# Patient Record
Sex: FEMALE | Race: WHITE | NOT HISPANIC OR LATINO | Employment: FULL TIME | URBAN - METROPOLITAN AREA
[De-identification: names, ages, dates, MRNs, and addresses within clinical notes are randomized per-mention and may not be internally consistent; named-entity substitution may affect disease eponyms.]

---

## 2023-02-22 ENCOUNTER — HOSPITAL ENCOUNTER (INPATIENT)
Facility: HOSPITAL | Age: 68
LOS: 3 days | Discharge: HOME | DRG: 661 | End: 2023-02-25
Attending: UROLOGY | Admitting: UROLOGY
Payer: COMMERCIAL

## 2023-02-22 DIAGNOSIS — N13.6 PYONEPHROSIS: ICD-10-CM

## 2023-02-22 DIAGNOSIS — N13.30 HYDRONEPHROSIS: Primary | ICD-10-CM

## 2023-02-22 DIAGNOSIS — N20.1 RIGHT URETERAL STONE: ICD-10-CM

## 2023-02-22 DIAGNOSIS — N20.0 KIDNEY STONE: ICD-10-CM

## 2023-02-22 PROCEDURE — 11000001 HC ACUTE MED/SURG PRIVATE ROOM

## 2023-02-22 RX ORDER — ESCITALOPRAM OXALATE 20 MG/1
20 TABLET ORAL DAILY
COMMUNITY

## 2023-02-22 RX ORDER — LEVOTHYROXINE SODIUM 112 UG/1
112 TABLET ORAL
COMMUNITY

## 2023-02-22 RX ORDER — ASPIRIN 325 MG
325 TABLET ORAL DAILY
COMMUNITY

## 2023-02-22 RX ORDER — ESCITALOPRAM OXALATE 10 MG/1
20 TABLET ORAL NIGHTLY
Status: DISCONTINUED | OUTPATIENT
Start: 2023-02-23 | End: 2023-02-25 | Stop reason: HOSPADM

## 2023-02-22 RX ORDER — HYDROMORPHONE HYDROCHLORIDE 2 MG/ML
1 INJECTION, SOLUTION INTRAMUSCULAR; INTRAVENOUS; SUBCUTANEOUS
Status: DISCONTINUED | OUTPATIENT
Start: 2023-02-23 | End: 2023-02-25 | Stop reason: HOSPADM

## 2023-02-22 RX ORDER — DEXTROSE MONOHYDRATE AND SODIUM CHLORIDE 5; .45 G/100ML; G/100ML
INJECTION, SOLUTION INTRAVENOUS CONTINUOUS
Status: DISCONTINUED | OUTPATIENT
Start: 2023-02-23 | End: 2023-02-25 | Stop reason: HOSPADM

## 2023-02-22 RX ORDER — ASPIRIN 325 MG
325 TABLET, DELAYED RELEASE (ENTERIC COATED) ORAL DAILY
Status: DISCONTINUED | OUTPATIENT
Start: 2023-02-23 | End: 2023-02-25 | Stop reason: HOSPADM

## 2023-02-22 RX ORDER — DEXTROSE MONOHYDRATE AND SODIUM CHLORIDE 5; .45 G/100ML; G/100ML
INJECTION, SOLUTION INTRAVENOUS CONTINUOUS
Status: DISCONTINUED | OUTPATIENT
Start: 2023-02-23 | End: 2023-02-22

## 2023-02-22 RX ORDER — LEVOTHYROXINE SODIUM 112 UG/1
112 TABLET ORAL
Status: DISCONTINUED | OUTPATIENT
Start: 2023-02-24 | End: 2023-02-25 | Stop reason: HOSPADM

## 2023-02-22 RX ORDER — HYDROCODONE BITARTRATE AND ACETAMINOPHEN 7.5; 325 MG/1; MG/1
1 TABLET ORAL EVERY 6 HOURS PRN
Status: DISCONTINUED | OUTPATIENT
Start: 2023-02-23 | End: 2023-02-25 | Stop reason: HOSPADM

## 2023-02-23 ENCOUNTER — ANESTHESIA EVENT (OUTPATIENT)
Dept: SURGERY | Facility: HOSPITAL | Age: 68
DRG: 661 | End: 2023-02-23
Payer: COMMERCIAL

## 2023-02-23 ENCOUNTER — ANESTHESIA (OUTPATIENT)
Dept: SURGERY | Facility: HOSPITAL | Age: 68
DRG: 661 | End: 2023-02-23
Payer: COMMERCIAL

## 2023-02-23 PROBLEM — N20.1 RIGHT URETERAL STONE: Status: ACTIVE | Noted: 2023-02-23

## 2023-02-23 PROBLEM — N13.6 PYONEPHROSIS: Status: ACTIVE | Noted: 2023-02-23

## 2023-02-23 PROBLEM — N13.30 HYDRONEPHROSIS: Status: ACTIVE | Noted: 2023-02-23

## 2023-02-23 PROCEDURE — 11000001 HC ACUTE MED/SURG PRIVATE ROOM

## 2023-02-23 PROCEDURE — 63600175 PHARM REV CODE 636 W HCPCS: Performed by: ANESTHESIOLOGY

## 2023-02-23 PROCEDURE — C2617 STENT, NON-COR, TEM W/O DEL: HCPCS | Performed by: UROLOGY

## 2023-02-23 PROCEDURE — 63600175 PHARM REV CODE 636 W HCPCS: Performed by: NURSE ANESTHETIST, CERTIFIED REGISTERED

## 2023-02-23 PROCEDURE — 25000003 PHARM REV CODE 250: Performed by: UROLOGY

## 2023-02-23 PROCEDURE — 36000706: Performed by: UROLOGY

## 2023-02-23 PROCEDURE — 25000003 PHARM REV CODE 250: Performed by: NURSE ANESTHETIST, CERTIFIED REGISTERED

## 2023-02-23 PROCEDURE — 36000707: Performed by: UROLOGY

## 2023-02-23 PROCEDURE — 37000008 HC ANESTHESIA 1ST 15 MINUTES: Performed by: UROLOGY

## 2023-02-23 PROCEDURE — 87088 URINE BACTERIA CULTURE: CPT | Performed by: UROLOGY

## 2023-02-23 PROCEDURE — C1758 CATHETER, URETERAL: HCPCS | Performed by: UROLOGY

## 2023-02-23 PROCEDURE — S5010 5% DEXTROSE AND 0.45% SALINE: HCPCS | Performed by: UROLOGY

## 2023-02-23 PROCEDURE — 71000033 HC RECOVERY, INTIAL HOUR: Performed by: UROLOGY

## 2023-02-23 PROCEDURE — 37000009 HC ANESTHESIA EA ADD 15 MINS: Performed by: UROLOGY

## 2023-02-23 PROCEDURE — C1769 GUIDE WIRE: HCPCS | Performed by: UROLOGY

## 2023-02-23 DEVICE — STENT SET URETERAL 6FR 22CM: Type: IMPLANTABLE DEVICE | Site: URETER | Status: FUNCTIONAL

## 2023-02-23 RX ORDER — PROPOFOL 10 MG/ML
INJECTION, EMULSION INTRAVENOUS
Status: DISCONTINUED | OUTPATIENT
Start: 2023-02-23 | End: 2023-02-23

## 2023-02-23 RX ORDER — HYDROMORPHONE HYDROCHLORIDE 2 MG/ML
0.4 INJECTION, SOLUTION INTRAMUSCULAR; INTRAVENOUS; SUBCUTANEOUS EVERY 5 MIN PRN
Status: DISCONTINUED | OUTPATIENT
Start: 2023-02-23 | End: 2023-02-25 | Stop reason: HOSPADM

## 2023-02-23 RX ORDER — MIDAZOLAM HYDROCHLORIDE 1 MG/ML
INJECTION INTRAMUSCULAR; INTRAVENOUS
Status: DISCONTINUED | OUTPATIENT
Start: 2023-02-23 | End: 2023-02-23

## 2023-02-23 RX ORDER — ONDANSETRON 2 MG/ML
INJECTION INTRAMUSCULAR; INTRAVENOUS
Status: DISCONTINUED | OUTPATIENT
Start: 2023-02-23 | End: 2023-02-23

## 2023-02-23 RX ORDER — OXYBUTYNIN CHLORIDE 5 MG/1
5 TABLET ORAL 3 TIMES DAILY
Status: DISCONTINUED | OUTPATIENT
Start: 2023-02-23 | End: 2023-02-25 | Stop reason: HOSPADM

## 2023-02-23 RX ORDER — SODIUM CHLORIDE, SODIUM LACTATE, POTASSIUM CHLORIDE, CALCIUM CHLORIDE 600; 310; 30; 20 MG/100ML; MG/100ML; MG/100ML; MG/100ML
INJECTION, SOLUTION INTRAVENOUS CONTINUOUS
Status: CANCELLED | OUTPATIENT
Start: 2023-02-23

## 2023-02-23 RX ORDER — HYDROCODONE BITARTRATE AND ACETAMINOPHEN 5; 325 MG/1; MG/1
1 TABLET ORAL
Status: DISCONTINUED | OUTPATIENT
Start: 2023-02-23 | End: 2023-02-25 | Stop reason: HOSPADM

## 2023-02-23 RX ORDER — ONDANSETRON 2 MG/ML
4 INJECTION INTRAMUSCULAR; INTRAVENOUS ONCE
Status: COMPLETED | OUTPATIENT
Start: 2023-02-23 | End: 2023-02-23

## 2023-02-23 RX ORDER — ONDANSETRON 2 MG/ML
INJECTION INTRAMUSCULAR; INTRAVENOUS
Status: DISPENSED
Start: 2023-02-23 | End: 2023-02-24

## 2023-02-23 RX ORDER — FENTANYL CITRATE 50 UG/ML
INJECTION, SOLUTION INTRAMUSCULAR; INTRAVENOUS
Status: DISCONTINUED | OUTPATIENT
Start: 2023-02-23 | End: 2023-02-23

## 2023-02-23 RX ORDER — ONDANSETRON 2 MG/ML
4 INJECTION INTRAMUSCULAR; INTRAVENOUS DAILY PRN
Status: DISCONTINUED | OUTPATIENT
Start: 2023-02-23 | End: 2023-02-25 | Stop reason: HOSPADM

## 2023-02-23 RX ORDER — PROCHLORPERAZINE EDISYLATE 5 MG/ML
5 INJECTION INTRAMUSCULAR; INTRAVENOUS EVERY 30 MIN PRN
Status: DISCONTINUED | OUTPATIENT
Start: 2023-02-23 | End: 2023-02-25 | Stop reason: HOSPADM

## 2023-02-23 RX ORDER — DEXAMETHASONE SODIUM PHOSPHATE 4 MG/ML
INJECTION, SOLUTION INTRA-ARTICULAR; INTRALESIONAL; INTRAMUSCULAR; INTRAVENOUS; SOFT TISSUE
Status: DISCONTINUED | OUTPATIENT
Start: 2023-02-23 | End: 2023-02-23

## 2023-02-23 RX ORDER — OXYCODONE HYDROCHLORIDE 5 MG/1
5 TABLET ORAL EVERY 4 HOURS PRN
Status: CANCELLED | OUTPATIENT
Start: 2023-02-23

## 2023-02-23 RX ORDER — LIDOCAINE HYDROCHLORIDE 20 MG/ML
INJECTION, SOLUTION EPIDURAL; INFILTRATION; INTRACAUDAL; PERINEURAL
Status: DISCONTINUED | OUTPATIENT
Start: 2023-02-23 | End: 2023-02-23

## 2023-02-23 RX ORDER — SODIUM CHLORIDE, SODIUM GLUCONATE, SODIUM ACETATE, POTASSIUM CHLORIDE AND MAGNESIUM CHLORIDE 30; 37; 368; 526; 502 MG/100ML; MG/100ML; MG/100ML; MG/100ML; MG/100ML
INJECTION, SOLUTION INTRAVENOUS CONTINUOUS
Status: CANCELLED | OUTPATIENT
Start: 2023-02-23 | End: 2023-03-25

## 2023-02-23 RX ORDER — CEFTRIAXONE 250 MG/1
INJECTION, POWDER, FOR SOLUTION INTRAMUSCULAR; INTRAVENOUS
Status: DISCONTINUED | OUTPATIENT
Start: 2023-02-23 | End: 2023-02-23

## 2023-02-23 RX ORDER — MIDAZOLAM HYDROCHLORIDE 1 MG/ML
2 INJECTION INTRAMUSCULAR; INTRAVENOUS ONCE AS NEEDED
Status: CANCELLED | OUTPATIENT
Start: 2023-02-23 | End: 2034-07-22

## 2023-02-23 RX ORDER — MEPERIDINE HYDROCHLORIDE 25 MG/ML
6.25 INJECTION INTRAMUSCULAR; INTRAVENOUS; SUBCUTANEOUS ONCE AS NEEDED
Status: ACTIVE | OUTPATIENT
Start: 2023-02-23 | End: 2023-02-24

## 2023-02-23 RX ADMIN — OXYBUTYNIN CHLORIDE 5 MG: 5 TABLET ORAL at 04:02

## 2023-02-23 RX ADMIN — SODIUM CHLORIDE, SODIUM GLUCONATE, SODIUM ACETATE, POTASSIUM CHLORIDE AND MAGNESIUM CHLORIDE: 526; 502; 368; 37; 30 INJECTION, SOLUTION INTRAVENOUS at 02:02

## 2023-02-23 RX ADMIN — DEXTROSE AND SODIUM CHLORIDE: 5; 450 INJECTION, SOLUTION INTRAVENOUS at 12:02

## 2023-02-23 RX ADMIN — ESCITSLOPRAM 20 MG: 10 TABLET ORAL at 09:02

## 2023-02-23 RX ADMIN — ONDANSETRON 4 MG: 2 INJECTION INTRAMUSCULAR; INTRAVENOUS at 03:02

## 2023-02-23 RX ADMIN — ONDANSETRON 4 MG: 2 INJECTION INTRAMUSCULAR; INTRAVENOUS at 01:02

## 2023-02-23 RX ADMIN — CEFTRIAXONE SODIUM 1 G: 250 INJECTION, POWDER, FOR SOLUTION INTRAMUSCULAR; INTRAVENOUS at 02:02

## 2023-02-23 RX ADMIN — DEXAMETHASONE SODIUM PHOSPHATE 8 MG: 4 INJECTION, SOLUTION INTRA-ARTICULAR; INTRALESIONAL; INTRAMUSCULAR; INTRAVENOUS; SOFT TISSUE at 02:02

## 2023-02-23 RX ADMIN — FENTANYL CITRATE 50 MCG: 50 INJECTION, SOLUTION INTRAMUSCULAR; INTRAVENOUS at 02:02

## 2023-02-23 RX ADMIN — FENTANYL CITRATE 50 MCG: 50 INJECTION, SOLUTION INTRAMUSCULAR; INTRAVENOUS at 03:02

## 2023-02-23 RX ADMIN — MIDAZOLAM HYDROCHLORIDE 2 MG: 1 INJECTION, SOLUTION INTRAMUSCULAR; INTRAVENOUS at 02:02

## 2023-02-23 RX ADMIN — PROPOFOL 200 MG: 10 INJECTION, EMULSION INTRAVENOUS at 02:02

## 2023-02-23 RX ADMIN — OXYBUTYNIN CHLORIDE 5 MG: 5 TABLET ORAL at 09:02

## 2023-02-23 RX ADMIN — DEXTROSE AND SODIUM CHLORIDE: 5; 450 INJECTION, SOLUTION INTRAVENOUS at 09:02

## 2023-02-23 RX ADMIN — LIDOCAINE HYDROCHLORIDE 80 MG: 20 INJECTION, SOLUTION EPIDURAL; INFILTRATION; INTRACAUDAL; PERINEURAL at 02:02

## 2023-02-23 NOTE — H&P
Yi January 1955  [id number]  2/23/2023    HPI: Ms. Ludwig is a 68 yo female transferred from [facility] in [place] for Urology services.  She has past medical history of hypertension, thyroid disease and diabetes presented to the emergency department under direction of her PCP with complaints of generalized weakness, decreased appetite and abnormal renal function.  Diagnosed with a UTI a few weeks ago, treated with antibiotics, once her course was complete she was still feeling bad so went to there ED, had repeat labs and no imaging despite persistent flank pain. Followed up with her PCP, had repeat labs which showed worsening renal function.  Reports associated nausea but denies vomiting, abdominal pain, fever, chills, gross hematuria or dysuria.  Labs on arrival there include WBC 13.5, H&H 10.9 and 34.6, BUN and creatinine 33 and 3.84, UA shows turbid yellow urine, negative nitrites, 10-20 red blood cells, too numerous to count white blood cells and no bacteria seen of note, after 2 days of admission and IV fluids her BUN and creatinine were down to 24 and 2.68.  Abdominal/pelvic CT showed right-sided UPJ stone measuring a maximum of approximately 12 mm causing right-sided hydronephrosis. Her flank pain is now intermittent. Denies known history of kidney stones or any other Urologic problems.       Past Medical History:   Diagnosis Date    Cancer      Past Surgical History:   Procedure Laterality Date    HYSTERECTOMY      THYROIDECTOMY       Family history reviewed; noncontributory    Social History     Substance Use Topics    Alcohol use: Never     Current Facility-Administered Medications   Medication Dose Route Frequency Provider Last Rate Last Admin    aspirin EC tablet 325 mg  325 mg Oral Daily Karri Reed MD        dextrose 5 % and 0.45 % NaCl infusion   Intravenous Continuous Karri Reed  mL/hr at 02/23/23 0907 New Bag at 02/23/23 0907    EScitalopram oxalate tablet 20  "mg  20 mg Oral QHS Karri Reed MD        HYDROcodone-acetaminophen 7.5-325 mg per tablet 1 tablet  1 tablet Oral Q6H PRN Karri Reed MD        HYDROmorphone (PF) injection 1 mg  1 mg Intravenous Q3H PRN Karri Reed MD        [START ON 2/24/2023] levothyroxine tablet 112 mcg  112 mcg Oral Before breakfast Karri Reed MD         Review of patient's allergies indicates:  No Known Allergies    ROS: 12 point review of systems negative other than the HPI    PHYSICAL EXAM:  Vitals:    02/22/23 2251 02/22/23 2259 02/23/23 0400 02/23/23 0744   BP: (!) 157/91 (!) 157/91 (!) 148/87 (!) 146/84   BP Location: Right arm      Pulse: 78 78 88 62   Resp: 18 18 18 18   Temp: 98.9 °F (37.2 °C) 98.9 °F (37.2 °C) 98 °F (36.7 °C) 97.9 °F (36.6 °C)   TempSrc: Oral  Oral Oral   SpO2:  98% 97% (!) 93%   Weight: 74.7 kg (164 lb 10.9 oz) 74.4 kg (164 lb 0.4 oz)     Height: 5' 1" (1.549 m) 5' 1" (1.549 m)       No intake or output data in the 24 hours ending 02/23/23 0909    GEN: WN/WD NAD  HEENT: NCAT, PERRLA, EOMI, OP clear, nares patent  CV: RRR  RESP: Even and unlabored  ABD: obese, soft, NTND  : no CVA tenderness  EXT: no C/C/E  NEURO: no focal deficits, MAEW, AAOx4      LABS:  No results found for this or any previous visit (from the past 24 hour(s)).      IMAGING: No disc sent for review      ASSESSMENT:  Acute renal failure secondary to an obstructing 12 mm UPJ stone also causing hydronephrosis. Also has been treated for recurrent UTI's the last few weeks prior to stone diagnosis      PLAN:  Discussed diagnosis, treatment options, risks and benefits. Patient agreeable to move forward with cysto and right ureteral stent placement today. Ultimately she will need ESWL which we can not offer her at this campus. She agrees to follow up with Dr. Blunt in [place] for ESWL, she would also like to be transferred back to [place] post-operatively for her physicians to resume care which is appropriate. Informed " consent obtained.    JANKI Rowe

## 2023-02-23 NOTE — OP NOTE
Discussion with collaborating staff took place since patient's last visit February 23, 2023    [facility]    Preop diagnosis is right ureteral stone with hydronephrosis  Postop diagnosis:  Right ureteral stone                                 Hydronephrosis with pyonephrosis  Surgeon Bean Madrigal   Anesthesia general    Complications none  Blood loss none    Findings:  1.2 cm proximal right ureteral stone with cloudy milky urine from the renal pelvis sent for culture    67-year-old female with recurrent urinary tract infections she is being treated in [place] for these.  She presented to the emergency room was found to have 1.2 cm right proximal stone.  She was transferred here for stent placement.  Risks and benefits I discussed usually leave the stent in for 2 weeks and then she can have definitive treatment.  She can choose to do this and they trials or she can come back here whatever her preference is fine        She is taken operative suite underwent general anesthesia placed in dorsal lithotomy prepped draped usual sterile fashion a 22 [place]ayan cystoscope with a 30 degree lens was inserted urethra was unremarkable,   looking in the bladder there was mild cystitis cystica on the floor ureteral orifices were normal size shape position with clear efflux bilaterally.  A 5 [place] open-ended catheter was inserted into the right ureteral orifice retrograde pyelogram revealed a normal distal ureter.  The large proximal ureteral stone was visible on  imaging contrast at this point pushed the stone back into the renal pelvis.  The catheter was advanced up into the collecting system.  Aspiration was carried out with return of milky cloudy urine from the right renal pelvis.  A Glidewire was inserted through the 5 U[place]ayan catheter.  A 6 x 22 JJ stent was then inserted on the right side using the Seldinger technique.  A good coil was obtained within the renal pelvis.  Look in the bladder stent was in good position.  There is continued milky drain around the stent.   She was awakened taken recovery room stable condition   Discussion with collaborating staff took place since patient's last visit Discussion with collaborating staff took place since patient's last visit Discussion with collaborating staff took place since patient's last visit Discussion with collaborating staff took place since patient's last visit Discussion with collaborating staff took place since patient's last visit Discussion with collaborating staff took place since patient's last visit Discussion with collaborating staff took place since patient's last visit

## 2023-02-23 NOTE — NURSING
Nurses Note -- 4 Eyes      2/23/2023   1:18 AM      Skin assessed during: Admit      [x] No Pressure Injuries Present    []Prevention Measures Documented      [] Yes- Altered Skin Integrity Present or Discovered   [] LDA Added if Not in Epic (Describe Wound)   [] New Altered Skin Integrity was Present on Admit and Documented in LDA   [] Wound Image Taken    Wound Care Consulted? No    Attending Nurse:  Shabana Laguerre RN     Second RN/Staff Member: Renetta HINOJOSA RN

## 2023-02-23 NOTE — ANESTHESIA PREPROCEDURE EVALUATION
"                                                                                                             02/23/2023  Yi Ludwig is a 67 y.o., female with ----------------------------  Cancer    And ----------------------------  Hysterectomy  Thyroidectomy    .for cysto with stent placement. Currently pt feels well, no nausea, no vomiting  Is NPO but is hungry       "Yi Ludwig 1955  [id number]  2/23/2023     HPI: Ms. Ludwig is a 66 yo female transferred from [facility] in [place] for Urology services.  She has past medical history of hypertension, thyroid disease and diabetes presented to the emergency department under direction of her PCP with complaints of generalized weakness, decreased appetite and abnormal renal function.  Diagnosed with a UTI a few weeks ago, treated with antibiotics, once her course was complete she was still feeling bad so went to there ED, had repeat labs and no imaging despite persistent flank pain. Followed up with her PCP, had repeat labs which showed worsening renal function.  Reports associated nausea but denies vomiting, abdominal pain, fever, chills, gross hematuria or dysuria.  Labs on arrival there include WBC 13.5, H&H 10.9 and 34.6, BUN and creatinine 33 and 3.84, UA shows turbid yellow urine, negative nitrites, 10-20 red blood cells, too numerous to count white blood cells and no bacteria seen of note, after 2 days of admission and IV fluids her BUN and creatinine were down to 24 and 2.68.  Abdominal/pelvic CT showed right-sided UPJ stone measuring a maximum of approximately 12 mm causing right-sided hydronephrosis. Her flank pain is now intermittent. Denies known history of kidney stones or any other Urologic problems. "    Pre-op Assessment    I have reviewed the NPO Status.      Review of Systems      Physical Exam  General: Well nourished, Cooperative, Alert and Oriented    Airway:  Mallampati: II   Mouth Opening: Normal  TM Distance: " Normal  Tongue: Normal  Neck ROM: Normal ROM    Dental:  Intact    Chest/Lungs:  Clear to auscultation, Normal Respiratory Rate    Heart:  Rate: Normal  Rhythm: Regular Rhythm        Anesthesia Plan  Type of Anesthesia, risks & benefits discussed:    Anesthesia Type: Gen Supraglottic Airway  Intra-op Monitoring Plan: Standard ASA Monitors  Post Op Pain Control Plan: IV/PO Opioids PRN and multimodal analgesia  Induction:  IV  Airway Plan: Direct  Informed Consent: Informed consent signed with the Patient and all parties understand the risks and agree with anesthesia plan.  All questions answered. Patient consented to blood products? No  ASA Score: 3  Day of Surgery Review of History & Physical: H&P Update referred to the surgeon/provider.  Anesthesia Plan Notes: Premedication with Midazolam  Technique: LMA with OG tube will have ETT readily available  PONV Prophylaxis   PACU Postop       Ready For Surgery From Anesthesia Perspective.     .

## 2023-02-23 NOTE — PLAN OF CARE
Problem: Adult Inpatient Plan of Care  Goal: Plan of Care Review  Outcome: Ongoing, Progressing  Flowsheets (Taken 2/23/2023 0119)  Plan of Care Reviewed With:   patient   daughter     Problem: Adult Inpatient Plan of Care  Goal: Absence of Hospital-Acquired Illness or Injury  Outcome: Ongoing, Progressing  Intervention: Identify and Manage Fall Risk  Flowsheets (Taken 2/23/2023 0119)  Safety Promotion/Fall Prevention:   assistive device/personal item within reach   lighting adjusted  Intervention: Prevent Skin Injury  Flowsheets (Taken 2/23/2023 0119)  Body Position:   position changed independently   weight shifting  Skin Protection: tubing/devices free from skin contact  Intervention: Prevent and Manage VTE (Venous Thromboembolism) Risk  Flowsheets (Taken 2/23/2023 0119)  Activity Management: Ambulated in room - L4  VTE Prevention/Management: bleeding risk assessed  Range of Motion:   active ROM (range of motion) encouraged   ROM (range of motion) performed  Intervention: Prevent Infection  Flowsheets (Taken 2/23/2023 0119)  Infection Prevention:   cohorting utilized   environmental surveillance performed   equipment surfaces disinfected   hand hygiene promoted   personal protective equipment utilized   rest/sleep promoted   single patient room provided   visitors restricted/screened     Problem: Adult Inpatient Plan of Care  Goal: Absence of Hospital-Acquired Illness or Injury  Intervention: Prevent and Manage VTE (Venous Thromboembolism) Risk  Flowsheets (Taken 2/23/2023 0119)  Activity Management: Ambulated in room - L4  VTE Prevention/Management: bleeding risk assessed  Range of Motion:   active ROM (range of motion) encouraged   ROM (range of motion) performed     Problem: Adult Inpatient Plan of Care  Goal: Optimal Comfort and Wellbeing  Outcome: Ongoing, Progressing  Intervention: Monitor Pain and Promote Comfort  Flowsheets (Taken 2/23/2023 0119)  Pain Management Interventions:   care clustered    medication offered  Intervention: Provide Person-Centered Care  Flowsheets (Taken 2/23/2023 0119)  Trust Relationship/Rapport:   care explained   choices provided   emotional support provided   empathic listening provided   questions answered   questions encouraged   reassurance provided   thoughts/feelings acknowledged

## 2023-02-23 NOTE — TRANSFER OF CARE
"Anesthesia Transfer of Care Note    Patient: Stephanie January    Procedure(s) Performed: Procedure(s) (LRB):  CYSTOSCOPY, WITH URETERAL STENT INSERTION (Right)    Patient location: PACU    Anesthesia Type: general    Transport from OR: Transported from OR on room air with adequate spontaneous ventilation    Post pain: adequate analgesia    Post assessment: no apparent anesthetic complications    Post vital signs: stable    Level of consciousness: sedated    Nausea/Vomiting: no nausea/vomiting    Complications: none    Transfer of care protocol was followed      Last vitals:   Visit Vitals  /61   Pulse 68   Temp 36.9 °C (98.5 °F) (Oral)   Resp (!) 9   Ht 5' 1" (1.549 m)   Wt 74.4 kg (164 lb 0.4 oz)   SpO2 (!) 92%   Breastfeeding No   BMI 30.99 kg/m²     "

## 2023-02-23 NOTE — PROGRESS NOTES
02/23/23 1118   Discharge Assessment   Assessment Type Discharge Planning Assessment   Confirmed/corrected address, phone number and insurance Yes   Confirmed Demographics Correct on Facesheet   Source of Information patient;family   When was your last doctors appointment? 02/20/23  (PCP: Dr. iSna Andrews)   Communicated KAPIL with patient/caregiver Date not available/Unable to determine   Reason For Admission hydronephrosis   People in Home spouse   Facility Arrived From: [facility]   Do you expect to return to your current living situation? Yes   Do you have help at home or someone to help you manage your care at home? Yes   Who are your caregiver(s) and their phone number(s)?  and daughter   Prior to hospitilization cognitive status: Alert/Oriented   Current cognitive status: Alert/Oriented   Home Accessibility wheelchair accessible   Home Layout Able to live on 1st floor   Equipment Currently Used at Home none   Readmission within 30 days? No   Patient currently being followed by outpatient case management? No   Do you currently have service(s) that help you manage your care at home? No   Do you take prescription medications? Yes  (fills rx at [facility])   Do you have prescription coverage? Yes   Coverage BCBS   Do you have any problems affording any of your prescribed medications? No   Is the patient taking medications as prescribed? yes   Who is going to help you get home at discharge? family   How do you get to doctors appointments? car, drives self   Are you on dialysis? No   Do you take coumadin? No   Discharge Plan A Other  (Transfer back to [facility])   DME Needed Upon Discharge  none   Discharge Plan discussed with: Patient;Adult children   Discharge Barriers Identified None     Will return to [facility] in [place] after stent is placed to finish renal monitoring via [facility].

## 2023-02-23 NOTE — PROGRESS NOTES
Inpatient Nutrition Assessment    Admit Date: 2/22/2023   Total duration of encounter: 1 day     Nutrition Recommendation/Prescription     Diabetic diet as tolerated.    Boost GC once daily. 250 kcals and 14 g protein per serving.    Boost Max once daily. 160 kcals and 30 g protein per serving.     Communication of Recommendations: reviewed with patient/caregiver and reviewed with nurse    Nutrition Assessment     Malnutrition Assessment/Nutrition-Focused Physical Exam    Malnutrition in the context of acute illness or injury  Degree of Malnutrition: non-severe (moderate) malnutrition  Energy Intake: </= 50% of estimated energy requirement for >/= 5 days  Interpretation of Weight Loss: >5% in 1 month  Body Fat:does not meet criteria  Area of Body Fat Loss: does not meet criteria  Muscle Mass Loss: mild depletion  Area of Muscle Mass Loss: temple region - temporalis muscle and clavicle bone region - pectoralis major, deltoid, trapezius muscles  Fluid Accumulation: does not meet criteria  Edema: does not meet criteria   Reduced  Strength: unable to obtain  A minimum of two characteristics is recommended for diagnosis of either severe or non-severe malnutrition.    Chart Review    Reason Seen: malnutrition screening tool (MST)    Malnutrition Screening Tool Results   Have you recently lost weight without trying?: Yes: 14-23 lbs  Have you been eating poorly because of a decreased appetite?: Yes   MST Score: 3     Diagnosis:  KAITLYNN  UPJ Stone  Hydronephrosis  DM    Relevant Medical History: hypertension, thyroid disease and diabetes    Nutrition-Related Medications: aspirin   Calorie Containing IV Medications: no significant kcals from medications at this time    Nutrition-Related Labs:  2/23/23: No recent labs     Diet/PN Order: Diet Adult Regular  Oral Supplement Order: none  Tube Feeding Order: none  Appetite/Oral Intake: NPO/NPO  Factors Affecting Nutritional Intake: NPO  Food/Judaism/Cultural Preferences: none  "reported  Food Allergies: none reported       Wound(s):   n/a    Comments    2/23/23: Pt reports poor appetite and nausea x 3 weeks, lost 12-15 lbs unintentionally over that time, last po intake was pudding last night. Pt had been losing weight intentionally for a while after making some changes to her diet to improve her health before this started. Agrees to vanilla Boost.     Anthropometrics    Height: 5' 1" (154.9 cm) Height Method: Measured  Last Weight: 74.4 kg (164 lb 0.4 oz) (02/22/23 2259) Weight Method: Standard Scale  BMI (Calculated): 31  BMI Classification: obese grade I (BMI 30-34.9)     Ideal Body Weight (IBW), Female: 105 lb     % Ideal Body Weight, Female (lb): 156.21 %                             Usual Weight Provided By: patient and EMR weight history  177-180 lbs 1 month ago per pt   Wt Readings from Last 5 Encounters:   02/22/23 74.4 kg (164 lb 0.4 oz)     Weight Change(s) Since Admission:  Admit Weight: 74.7 kg (164 lb 10.9 oz) (02/22/23 2251)      Estimated Needs     Energy: 2486-6946 kcals (20-25 kcals/kg CBW)    Protein: 90 g (1.2 g/kg CBW)    Fluid: 1860 ml (25 ml/kg CBW)            Temp: 97.5 °F (36.4 °C)       Enteral Nutrition    Patient not receiving enteral nutrition at this time.    Parenteral Nutrition    Patient not receiving parenteral nutrition support at this time.    Evaluation of Received Nutrient Intake    Calories: not meeting estimated needs  Protein: not meeting estimated needs    Patient Education    Not applicable.    Nutrition Diagnosis     PES: Malnutrition related to energy intake < energy demand as evidenced by unintentional weight loss, poor po intake, muscle loss in the setting of .significant nausea d/t hydronephrosis (new)    Interventions/Goals     Intervention(s): general/healthful diet, commercial beverage, and collaboration with other providers  Goal: Meet greater than 75% of nutritional needs by follow-up. (new)    Monitoring & Evaluation     Dietitian will " monitor food and beverage intake, weight, electrolyte/renal panel, glucose/endocrine profile, and gastrointestinal profile.  Nutrition Risk/Follow-Up: high (follow-up in 1-4 days)   Please consult if re-assessment needed sooner.

## 2023-02-23 NOTE — ANESTHESIA PROCEDURE NOTES
Intubation    Date/Time: 2/23/2023 2:46 PM  Performed by: Eden Barber CRNA  Authorized by: Maggie Mccoy MD     Intubation:     Induction:  Intravenous    Intubated:  Postinduction    Mask Ventilation:  Easy mask    Attempts:  1    Attempted By:  CRNA    Difficult Airway Encountered?: No      Complications:  None    Airway Device:  Supraglottic airway/LMA    Airway Device Size:  4.0    Style/Cuff Inflation:  Cuffed    Secured at:  The lips

## 2023-02-23 NOTE — PLAN OF CARE
Problem: Adult Inpatient Plan of Care  Goal: Plan of Care Review  Outcome: Ongoing, Progressing  Goal: Patient-Specific Goal (Individualized)  Outcome: Ongoing, Progressing  Goal: Absence of Hospital-Acquired Illness or Injury  Outcome: Ongoing, Progressing  Intervention: Identify and Manage Fall Risk  Flowsheets (Taken 2/23/2023 1211)  Safety Promotion/Fall Prevention:   assistive device/personal item within reach   nonskid shoes/socks when out of bed   side rails raised x 2  Intervention: Prevent Skin Injury  Flowsheets (Taken 2/23/2023 1211)  Body Position: position changed independently  Intervention: Prevent and Manage VTE (Venous Thromboembolism) Risk  Flowsheets (Taken 2/23/2023 1211)  Activity Management: Ambulated in room - L4  Goal: Optimal Comfort and Wellbeing  Outcome: Ongoing, Progressing  Intervention: Provide Person-Centered Care  Flowsheets (Taken 2/23/2023 1211)  Trust Relationship/Rapport:   care explained   questions encouraged   questions answered  Goal: Readiness for Transition of Care  Outcome: Ongoing, Progressing     Problem: Pain Acute  Goal: Acceptable Pain Control and Functional Ability  Outcome: Ongoing, Progressing

## 2023-02-24 LAB
ANION GAP SERPL CALC-SCNC: 6 MEQ/L
BASOPHILS # BLD AUTO: 0.03 X10(3)/MCL (ref 0–0.2)
BASOPHILS NFR BLD AUTO: 0.5 %
BUN SERPL-MCNC: 17.6 MG/DL (ref 9.8–20.1)
CALCIUM SERPL-MCNC: 8.3 MG/DL (ref 8.4–10.2)
CHLORIDE SERPL-SCNC: 110 MMOL/L (ref 98–107)
CO2 SERPL-SCNC: 23 MMOL/L (ref 23–31)
CREAT SERPL-MCNC: 2.22 MG/DL (ref 0.55–1.02)
CREAT/UREA NIT SERPL: 8
EOSINOPHIL # BLD AUTO: 0 X10(3)/MCL (ref 0–0.9)
EOSINOPHIL NFR BLD AUTO: 0 %
ERYTHROCYTE [DISTWIDTH] IN BLOOD BY AUTOMATED COUNT: 14.3 % (ref 11.5–17)
GFR SERPLBLD CREATININE-BSD FMLA CKD-EPI: 24 MLS/MIN/1.73/M2
GLUCOSE SERPL-MCNC: 149 MG/DL (ref 82–115)
HCT VFR BLD AUTO: 27.8 % (ref 37–47)
HGB BLD-MCNC: 8.4 G/DL (ref 12–16)
IMM GRANULOCYTES # BLD AUTO: 0.02 X10(3)/MCL (ref 0–0.04)
IMM GRANULOCYTES NFR BLD AUTO: 0.3 %
LYMPHOCYTES # BLD AUTO: 1.14 X10(3)/MCL (ref 0.6–4.6)
LYMPHOCYTES NFR BLD AUTO: 19.9 %
MCH RBC QN AUTO: 27.6 PG
MCHC RBC AUTO-ENTMCNC: 30.2 G/DL (ref 33–36)
MCV RBC AUTO: 91.4 FL (ref 80–94)
MONOCYTES # BLD AUTO: 0.18 X10(3)/MCL (ref 0.1–1.3)
MONOCYTES NFR BLD AUTO: 3.1 %
NEUTROPHILS # BLD AUTO: 4.35 X10(3)/MCL (ref 2.1–9.2)
NEUTROPHILS NFR BLD AUTO: 76.2 %
NRBC BLD AUTO-RTO: 0 %
PLATELET # BLD AUTO: 291 X10(3)/MCL (ref 130–400)
PMV BLD AUTO: 9.1 FL (ref 7.4–10.4)
POTASSIUM SERPL-SCNC: 4.6 MMOL/L (ref 3.5–5.1)
RBC # BLD AUTO: 3.04 X10(6)/MCL (ref 4.2–5.4)
SODIUM SERPL-SCNC: 139 MMOL/L (ref 136–145)
WBC # SPEC AUTO: 5.7 X10(3)/MCL (ref 4.5–11.5)

## 2023-02-24 PROCEDURE — 63600175 PHARM REV CODE 636 W HCPCS: Performed by: UROLOGY

## 2023-02-24 PROCEDURE — 25000003 PHARM REV CODE 250: Performed by: UROLOGY

## 2023-02-24 PROCEDURE — S5010 5% DEXTROSE AND 0.45% SALINE: HCPCS | Performed by: UROLOGY

## 2023-02-24 PROCEDURE — 76937 US GUIDE VASCULAR ACCESS: CPT

## 2023-02-24 PROCEDURE — 36410 VNPNXR 3YR/> PHY/QHP DX/THER: CPT

## 2023-02-24 PROCEDURE — 11000001 HC ACUTE MED/SURG PRIVATE ROOM

## 2023-02-24 PROCEDURE — 80048 BASIC METABOLIC PNL TOTAL CA: CPT | Performed by: UROLOGY

## 2023-02-24 PROCEDURE — C1751 CATH, INF, PER/CENT/MIDLINE: HCPCS

## 2023-02-24 PROCEDURE — 85025 COMPLETE CBC W/AUTO DIFF WBC: CPT | Performed by: UROLOGY

## 2023-02-24 RX ADMIN — OXYBUTYNIN CHLORIDE 5 MG: 5 TABLET ORAL at 08:02

## 2023-02-24 RX ADMIN — LEVOTHYROXINE SODIUM 112 MCG: 112 TABLET ORAL at 06:02

## 2023-02-24 RX ADMIN — CEFTRIAXONE 1 G: 1 INJECTION, POWDER, FOR SOLUTION INTRAMUSCULAR; INTRAVENOUS at 01:02

## 2023-02-24 RX ADMIN — DEXTROSE AND SODIUM CHLORIDE: 5; 450 INJECTION, SOLUTION INTRAVENOUS at 09:02

## 2023-02-24 RX ADMIN — OXYBUTYNIN CHLORIDE 5 MG: 5 TABLET ORAL at 03:02

## 2023-02-24 RX ADMIN — ASPIRIN 325 MG: 325 TABLET, COATED ORAL at 08:02

## 2023-02-24 RX ADMIN — ESCITSLOPRAM 20 MG: 10 TABLET ORAL at 08:02

## 2023-02-24 NOTE — PLAN OF CARE
Problem: Adult Inpatient Plan of Care  Goal: Plan of Care Review  Outcome: Ongoing, Progressing  Flowsheets (Taken 2/24/2023 0104)  Plan of Care Reviewed With:   patient   daughter  Goal: Patient-Specific Goal (Individualized)  Outcome: Ongoing, Progressing  Goal: Absence of Hospital-Acquired Illness or Injury  Outcome: Ongoing, Progressing  Intervention: Identify and Manage Fall Risk  Flowsheets (Taken 2/24/2023 0104)  Safety Promotion/Fall Prevention:   assistive device/personal item within reach   medications reviewed   nonskid shoes/socks when out of bed   side rails raised x 2  Intervention: Prevent Skin Injury  Flowsheets (Taken 2/24/2023 0104)  Body Position: position changed independently  Skin Protection:   adhesive use limited   incontinence pads utilized   tubing/devices free from skin contact  Intervention: Prevent and Manage VTE (Venous Thromboembolism) Risk  Flowsheets (Taken 2/24/2023 0104)  Activity Management: Ambulated to bathroom - L4  VTE Prevention/Management:   ambulation promoted   bleeding risk assessed   ROM (active) performed  Range of Motion:   active ROM (range of motion) encouraged   ROM (range of motion) performed  Intervention: Prevent Infection  Flowsheets (Taken 2/24/2023 0104)  Infection Prevention:   rest/sleep promoted   single patient room provided  Goal: Optimal Comfort and Wellbeing  Outcome: Ongoing, Progressing  Intervention: Monitor Pain and Promote Comfort  Flowsheets (Taken 2/24/2023 0104)  Pain Management Interventions:   care clustered   medication offered   pain management plan reviewed with patient/caregiver   pillow support provided   position adjusted  Intervention: Provide Person-Centered Care  Flowsheets (Taken 2/24/2023 0104)  Trust Relationship/Rapport: care explained  Goal: Readiness for Transition of Care  Outcome: Ongoing, Progressing     Problem: Pain Acute  Goal: Acceptable Pain Control and Functional Ability  Outcome: Ongoing, Progressing  Intervention:  Develop Pain Management Plan  Flowsheets (Taken 2/24/2023 0104)  Pain Management Interventions:   care clustered   medication offered   pain management plan reviewed with patient/caregiver   pillow support provided   position adjusted  Intervention: Prevent or Manage Pain  Flowsheets (Taken 2/24/2023 0104)  Medication Review/Management: medications reviewed  Intervention: Optimize Psychosocial Wellbeing  Flowsheets (Taken 2/24/2023 0104)  Supportive Measures: active listening utilized

## 2023-02-24 NOTE — ANESTHESIA POSTPROCEDURE EVALUATION
Anesthesia Post Evaluation    Patient: Stephanie January    Procedure(s) Performed: Procedure(s) (LRB):  CYSTOSCOPY, WITH URETERAL STENT INSERTION (Right)    Final Anesthesia Type: general      Patient location during evaluation: PACU  Patient participation: Yes- Able to Participate  Level of consciousness: awake and alert  Post-procedure vital signs: reviewed and stable  Pain management: adequate  Airway patency: patent    PONV status at discharge: No PONV  Anesthetic complications: no      Cardiovascular status: hemodynamically stable  Respiratory status: unassisted  Hydration status: euvolemic  Follow-up not needed.          Vitals Value Taken Time   /78 02/24/23 0743   Temp 36.6 °C (97.8 °F) 02/24/23 0743   Pulse 58 02/24/23 0743   Resp 19 02/24/23 0743   SpO2 96 % 02/24/23 0743         No case tracking events are documented in the log.      Pain/Mk Score: Mk Score: 10 (2/23/2023  4:15 PM)

## 2023-02-24 NOTE — PROGRESS NOTES
.UROLOGY  PROGRESS  NOTE    Yi January 1955  [id number]  2/24/2023    Status post cystoscopy with right ureteral stent placement POD 1    Feeling better today  Denies complaints  Reports cloudy urine  Questionable UO  BUN/creatinine 17.6 & 2.22  VSS;afebrile      Intake/Output:  No intake/output data recorded.  I/O last 3 completed shifts:  In: 2760 [P.O.:660; I.V.:1500; IV Piggyback:600]  Out: 800 [Urine:800]       Exam:    NAD  Card RRR  Resp unlabored  Extremity no C/C/E      Recent Results (from the past 24 hour(s))   Urine culture    Collection Time: 02/23/23  4:38 PM    Specimen: Urine, Clean Catch   Result Value Ref Range    Urine Culture No Growth At 24 Hours    CBC with Differential    Collection Time: 02/24/23  3:49 AM   Result Value Ref Range    WBC 5.7 4.5 - 11.5 x10(3)/mcL    RBC 3.04 (L) 4.20 - 5.40 x10(6)/mcL    Hgb 8.4 (L) 12.0 - 16.0 g/dL    Hct 27.8 (L) 37.0 - 47.0 %    MCV 91.4 80.0 - 94.0 fL    MCH 27.6 pg    MCHC 30.2 (L) 33.0 - 36.0 g/dL    RDW 14.3 11.5 - 17.0 %    Platelet 291 130 - 400 x10(3)/mcL    MPV 9.1 7.4 - 10.4 fL    Neut % 76.2 %    Lymph % 19.9 %    Mono % 3.1 %    Eos % 0.0 %    Basophil % 0.5 %    Lymph # 1.14 0.6 - 4.6 x10(3)/mcL    Neut # 4.35 2.1 - 9.2 x10(3)/mcL    Mono # 0.18 0.1 - 1.3 x10(3)/mcL    Eos # 0.00 0 - 0.9 x10(3)/mcL    Baso # 0.03 0 - 0.2 x10(3)/mcL    IG# 0.02 0 - 0.04 x10(3)/mcL    IG% 0.3 %    NRBC% 0.0 %   Basic Metabolic Panel    Collection Time: 02/24/23  3:50 AM   Result Value Ref Range    Sodium Level 139 136 - 145 mmol/L    Potassium Level 4.6 3.5 - 5.1 mmol/L    Chloride 110 (H) 98 - 107 mmol/L    Carbon Dioxide 23 23 - 31 mmol/L    Glucose Level 149 (H) 82 - 115 mg/dL    Blood Urea Nitrogen 17.6 9.8 - 20.1 mg/dL    Creatinine 2.22 (H) 0.55 - 1.02 mg/dL    BUN/Creatinine Ratio 8     Calcium Level Total 8.3 (L) 8.4 - 10.2 mg/dL    Anion Gap 6.0 mEq/L    eGFR 24 mls/min/1.73/m2         Assessment:  Right ureteral stone with pyelonephrosis and  KAITLYNN      Plan:  Awaiting surgical urine culture, currently on Rocephin  CBC/BMP in AM   Ambulate & IS  Likely discharge home within the next 24-48 hours  Will get her scheduled for ESWL here in 1-2 weeks after abx course complete    JANKI Rowe

## 2023-02-25 VITALS
OXYGEN SATURATION: 98 % | TEMPERATURE: 98 F | BODY MASS INDEX: 30.96 KG/M2 | SYSTOLIC BLOOD PRESSURE: 167 MMHG | HEART RATE: 71 BPM | RESPIRATION RATE: 16 BRPM | HEIGHT: 61 IN | DIASTOLIC BLOOD PRESSURE: 85 MMHG | WEIGHT: 164 LBS

## 2023-02-25 PROBLEM — N13.6 PYONEPHROSIS: Status: RESOLVED | Noted: 2023-02-23 | Resolved: 2023-02-25

## 2023-02-25 PROBLEM — N13.30 HYDRONEPHROSIS: Status: RESOLVED | Noted: 2023-02-23 | Resolved: 2023-02-25

## 2023-02-25 LAB
ANION GAP SERPL CALC-SCNC: 10 MEQ/L
BACTERIA UR CULT: NO GROWTH
BASOPHILS # BLD AUTO: 0.06 X10(3)/MCL (ref 0–0.2)
BASOPHILS NFR BLD AUTO: 0.6 %
BUN SERPL-MCNC: 18.8 MG/DL (ref 9.8–20.1)
CALCIUM SERPL-MCNC: 8.8 MG/DL (ref 8.4–10.2)
CHLORIDE SERPL-SCNC: 110 MMOL/L (ref 98–107)
CO2 SERPL-SCNC: 22 MMOL/L (ref 23–31)
CREAT SERPL-MCNC: 1.97 MG/DL (ref 0.55–1.02)
CREAT/UREA NIT SERPL: 10
EOSINOPHIL # BLD AUTO: 0.12 X10(3)/MCL (ref 0–0.9)
EOSINOPHIL NFR BLD AUTO: 1.2 %
ERYTHROCYTE [DISTWIDTH] IN BLOOD BY AUTOMATED COUNT: 14.4 % (ref 11.5–17)
GFR SERPLBLD CREATININE-BSD FMLA CKD-EPI: 28 MLS/MIN/1.73/M2
GLUCOSE SERPL-MCNC: 77 MG/DL (ref 82–115)
HCT VFR BLD AUTO: 31.7 % (ref 37–47)
HGB BLD-MCNC: 9.7 G/DL (ref 12–16)
IMM GRANULOCYTES # BLD AUTO: 0.03 X10(3)/MCL (ref 0–0.04)
IMM GRANULOCYTES NFR BLD AUTO: 0.3 %
LYMPHOCYTES # BLD AUTO: 2.49 X10(3)/MCL (ref 0.6–4.6)
LYMPHOCYTES NFR BLD AUTO: 24.4 %
MCH RBC QN AUTO: 28.4 PG
MCHC RBC AUTO-ENTMCNC: 30.6 G/DL (ref 33–36)
MCV RBC AUTO: 92.7 FL (ref 80–94)
MONOCYTES # BLD AUTO: 0.48 X10(3)/MCL (ref 0.1–1.3)
MONOCYTES NFR BLD AUTO: 4.7 %
NEUTROPHILS # BLD AUTO: 7.04 X10(3)/MCL (ref 2.1–9.2)
NEUTROPHILS NFR BLD AUTO: 68.8 %
NRBC BLD AUTO-RTO: 0 %
PLATELET # BLD AUTO: 315 X10(3)/MCL (ref 130–400)
PMV BLD AUTO: 9.6 FL (ref 7.4–10.4)
POTASSIUM SERPL-SCNC: 4.4 MMOL/L (ref 3.5–5.1)
RBC # BLD AUTO: 3.42 X10(6)/MCL (ref 4.2–5.4)
SODIUM SERPL-SCNC: 142 MMOL/L (ref 136–145)
WBC # SPEC AUTO: 10.2 X10(3)/MCL (ref 4.5–11.5)

## 2023-02-25 PROCEDURE — 80048 BASIC METABOLIC PNL TOTAL CA: CPT | Performed by: NURSE PRACTITIONER

## 2023-02-25 PROCEDURE — S5010 5% DEXTROSE AND 0.45% SALINE: HCPCS | Performed by: UROLOGY

## 2023-02-25 PROCEDURE — A4216 STERILE WATER/SALINE, 10 ML: HCPCS | Performed by: UROLOGY

## 2023-02-25 PROCEDURE — 85025 COMPLETE CBC W/AUTO DIFF WBC: CPT | Performed by: NURSE PRACTITIONER

## 2023-02-25 PROCEDURE — 63600175 PHARM REV CODE 636 W HCPCS: Performed by: UROLOGY

## 2023-02-25 PROCEDURE — 25000003 PHARM REV CODE 250: Performed by: UROLOGY

## 2023-02-25 RX ORDER — MUPIROCIN 20 MG/G
OINTMENT TOPICAL 2 TIMES DAILY
Status: DISCONTINUED | OUTPATIENT
Start: 2023-02-25 | End: 2023-02-25 | Stop reason: HOSPADM

## 2023-02-25 RX ORDER — SODIUM CHLORIDE 0.9 % (FLUSH) 0.9 %
10 SYRINGE (ML) INJECTION EVERY 6 HOURS
Status: DISCONTINUED | OUTPATIENT
Start: 2023-02-25 | End: 2023-02-25 | Stop reason: HOSPADM

## 2023-02-25 RX ORDER — OXYBUTYNIN CHLORIDE 10 MG/1
10 TABLET, EXTENDED RELEASE ORAL DAILY
Qty: 30 TABLET | Refills: 1 | Status: SHIPPED | OUTPATIENT
Start: 2023-02-25 | End: 2024-02-25

## 2023-02-25 RX ORDER — HYDROCODONE BITARTRATE AND ACETAMINOPHEN 7.5; 325 MG/1; MG/1
1 TABLET ORAL EVERY 6 HOURS PRN
Qty: 20 TABLET | Refills: 0 | Status: SHIPPED | OUTPATIENT
Start: 2023-02-25

## 2023-02-25 RX ORDER — SODIUM CHLORIDE 0.9 % (FLUSH) 0.9 %
10 SYRINGE (ML) INJECTION
Status: DISCONTINUED | OUTPATIENT
Start: 2023-02-25 | End: 2023-02-25 | Stop reason: HOSPADM

## 2023-02-25 RX ADMIN — ASPIRIN 325 MG: 325 TABLET, COATED ORAL at 08:02

## 2023-02-25 RX ADMIN — OXYBUTYNIN CHLORIDE 5 MG: 5 TABLET ORAL at 08:02

## 2023-02-25 RX ADMIN — CEFTRIAXONE 1 G: 1 INJECTION, POWDER, FOR SOLUTION INTRAMUSCULAR; INTRAVENOUS at 11:02

## 2023-02-25 RX ADMIN — DEXTROSE AND SODIUM CHLORIDE: 5; 450 INJECTION, SOLUTION INTRAVENOUS at 06:02

## 2023-02-25 RX ADMIN — Medication 10 ML: at 11:02

## 2023-02-25 RX ADMIN — LEVOTHYROXINE SODIUM 112 MCG: 112 TABLET ORAL at 05:02

## 2023-02-25 RX ADMIN — Medication 10 ML: at 06:02

## 2023-02-25 NOTE — PLAN OF CARE
Problem: Adult Inpatient Plan of Care  Goal: Plan of Care Review  Outcome: Ongoing, Progressing  Goal: Patient-Specific Goal (Individualized)  Outcome: Ongoing, Progressing  Goal: Absence of Hospital-Acquired Illness or Injury  Outcome: Ongoing, Progressing  Intervention: Identify and Manage Fall Risk  Flowsheets (Taken 2/25/2023 0730)  Safety Promotion/Fall Prevention:   assistive device/personal item within reach   side rails raised x 2   nonskid shoes/socks when out of bed  Intervention: Prevent Skin Injury  Flowsheets (Taken 2/25/2023 0730)  Body Position: position changed independently  Intervention: Prevent and Manage VTE (Venous Thromboembolism) Risk  Flowsheets (Taken 2/25/2023 0730)  VTE Prevention/Management: ambulation promoted  Intervention: Prevent Infection  Flowsheets (Taken 2/25/2023 0730)  Infection Prevention:   single patient room provided   rest/sleep promoted  Goal: Optimal Comfort and Wellbeing  Outcome: Ongoing, Progressing  Intervention: Monitor Pain and Promote Comfort  Flowsheets (Taken 2/25/2023 0730)  Pain Management Interventions: quiet environment facilitated  Goal: Readiness for Transition of Care  Outcome: Ongoing, Progressing     Problem: Pain Acute  Goal: Acceptable Pain Control and Functional Ability  Outcome: Ongoing, Progressing     Problem: Infection  Goal: Absence of Infection Signs and Symptoms  Outcome: Ongoing, Progressing

## 2023-02-25 NOTE — PROCEDURES
"Yi Ludwig is a 67 y.o. female patient.    Temp: 97.8 °F (36.6 °C) (02/24/23 1937)  Pulse: 61 (02/24/23 1937)  Resp: 18 (02/24/23 1937)  BP: 134/73 (02/24/23 1937)  SpO2: 95 % (02/24/23 1937)  Weight: 74.4 kg (164 lb 0.4 oz) (02/22/23 2259)  Height: 5' 1" (154.9 cm) (02/22/23 2259)    PICC  Date/Time: 2/24/2023 7:51 PM  Performed by: Pedrito Rainey RN  Consent Done: Yes  Time out: Immediately prior to procedure a time out was called to verify the correct patient, procedure, equipment, support staff and site/side marked as required  Indications: med administration and vascular access  Anesthesia: local infiltration  Local anesthetic: lidocaine 1% without epinephrine  Anesthetic Total (mL): 5  Preparation: skin prepped with ChloraPrep  Skin prep agent dried: skin prep agent completely dried prior to procedure  Sterile barriers: all five maximum sterile barriers used - cap, mask, sterile gown, sterile gloves, and large sterile sheet  Hand hygiene: hand hygiene performed prior to central venous catheter insertion  Location details: left basilic  Catheter type: single lumen  Catheter size: 4 Fr  Catheter Length: 14cm    Ultrasound guidance: yes  Vessel Caliber: medium and patent, compressibility normal  Vascular Doppler: not done  Needle advanced into vessel with real time Ultrasound guidance.  Guidewire confirmed in vessel.  Sterile sheath used.  no esophageal manometryNumber of attempts: 1  Post-procedure: chlorhexidine patch, blood return through all ports and sterile dressing applied    Assessment: successful placement  Complications: none        Pedrito Rainey RN  2/24/2023    "

## 2023-02-25 NOTE — DISCHARGE SUMMARY
Discharge date 02/25/2023  Admit date 02/22/2023    [facility]    Admitting diagnosis:  Acute on chronic renal insufficiency                                     Right hydronephrosis secondary to ureteral stone    Discharge diagnosis: Same    Hospital course :  67-year-old female with crying urinary tract infections and worsening renal failure was transferred from [facility] in [place] for treatment of right obstructive uropathy secondary to a 12 mm stone.  She was admitted started on IV antibiotics and taken back to surgery for stent placement.  Since that time her creatinine has come down from over 3-1.9.  Her urine culture and right renal culture was no growth she will be discharged home today on Norco for pain and Ditropan for bladder spasms otherwise she can restart her home medications including her blood pressure medicine she will follow-up with her nephrologist and primary care in her home town.  I will see her back in 2 weeks and at that time we will schedule her for ureteroscopy laser lithotripsy of the right renal stone.  She can resume her home med medications and diet.  No activity restrictions.  She is to call should she have temperature over 101 or for other problems.

## 2023-02-25 NOTE — PLAN OF CARE
Problem: Adult Inpatient Plan of Care  Goal: Plan of Care Review  Outcome: Ongoing, Progressing  Flowsheets (Taken 2/24/2023 2128)  Plan of Care Reviewed With:   patient   daughter     Problem: Adult Inpatient Plan of Care  Goal: Absence of Hospital-Acquired Illness or Injury  Outcome: Ongoing, Progressing  Intervention: Identify and Manage Fall Risk  Flowsheets (Taken 2/24/2023 2128)  Safety Promotion/Fall Prevention:   assistive device/personal item within reach   instructed to call staff for mobility  Intervention: Prevent Skin Injury  Flowsheets (Taken 2/24/2023 2128)  Body Position: position changed independently  Intervention: Prevent and Manage VTE (Venous Thromboembolism) Risk  Flowsheets (Taken 2/24/2023 2128)  Activity Management: Ambulated -L4  VTE Prevention/Management: remove, assess skin, and reapply sequential compression device  Range of Motion:   active ROM (range of motion) encouraged   ROM (range of motion) performed     Problem: Adult Inpatient Plan of Care  Goal: Optimal Comfort and Wellbeing  Outcome: Ongoing, Progressing  Intervention: Monitor Pain and Promote Comfort  Flowsheets (Taken 2/24/2023 2128)  Pain Management Interventions:   care clustered   pillow support provided   quiet environment facilitated  Intervention: Provide Person-Centered Care  Flowsheets (Taken 2/24/2023 2128)  Trust Relationship/Rapport:   care explained   choices provided   emotional support provided   empathic listening provided   questions answered   questions encouraged   reassurance provided   thoughts/feelings acknowledged

## 2023-02-28 ENCOUNTER — PATIENT MESSAGE (OUTPATIENT)
Dept: ADMINISTRATIVE | Facility: OTHER | Age: 68
End: 2023-02-28
Payer: COMMERCIAL

## 2023-02-28 ENCOUNTER — TELEPHONE (OUTPATIENT)
Dept: ADMINISTRATIVE | Facility: CLINIC | Age: 68
End: 2023-02-28
Payer: COMMERCIAL

## 2023-02-28 ENCOUNTER — PATIENT MESSAGE (OUTPATIENT)
Dept: ADMINISTRATIVE | Facility: CLINIC | Age: 68
End: 2023-02-28
Payer: COMMERCIAL

## 2023-03-01 ENCOUNTER — PATIENT OUTREACH (OUTPATIENT)
Dept: ADMINISTRATIVE | Facility: CLINIC | Age: 68
End: 2023-03-01
Payer: COMMERCIAL

## 2023-03-01 NOTE — PROGRESS NOTES
C3 nurse attempted to contact Yi January for a TCC post hospital discharge follow up call. No answer or voicemail on pts cell/home. Spoke with pts spouse who requested callback on the pts phone and verified her phone number. Pt does not have follow up with Bean Madrigal MD (urology) listed in [facility]. No PCP, unable to route. Will attempt additional TCC call at a later time.

## 2023-03-02 NOTE — PROGRESS NOTES
C3 nurse spoke with Yi Ludwig for a TCC post hospital discharge follow up call. Pt denies any new symptoms or complaints, states she has the [facility] number in her discharge packet. The patient has a scheduled Follow up with Bean Madrigal MD (Urology) on 3/13/23 at 1100. No PCP, unable to route.

## 2025-04-09 DIAGNOSIS — N20.0 CALCULUS OF KIDNEY: Primary | ICD-10-CM

## 2025-05-23 ENCOUNTER — TELEPHONE (OUTPATIENT)
Dept: UROLOGY | Facility: CLINIC | Age: 70
End: 2025-05-23
Payer: COMMERCIAL

## 2025-07-01 ENCOUNTER — OFFICE VISIT (OUTPATIENT)
Dept: UROLOGY | Facility: CLINIC | Age: 70
End: 2025-07-01
Payer: COMMERCIAL

## 2025-07-01 ENCOUNTER — HOSPITAL ENCOUNTER (OUTPATIENT)
Dept: RADIOLOGY | Facility: CLINIC | Age: 70
Discharge: HOME | End: 2025-07-01
Attending: UROLOGY
Payer: COMMERCIAL

## 2025-07-01 VITALS
WEIGHT: 129 LBS | DIASTOLIC BLOOD PRESSURE: 79 MMHG | HEART RATE: 82 BPM | SYSTOLIC BLOOD PRESSURE: 112 MMHG | HEIGHT: 60 IN | BODY MASS INDEX: 25.32 KG/M2

## 2025-07-01 DIAGNOSIS — N20.0 CALCULUS OF KIDNEY: ICD-10-CM

## 2025-07-01 DIAGNOSIS — N22 CALCULUS OF URINARY TRACT IN DISEASES CLASSIFIED ELSEWHERE: Primary | ICD-10-CM

## 2025-07-01 PROCEDURE — 74018 RADEX ABDOMEN 1 VIEW: CPT | Mod: 26,,, | Performed by: RADIOLOGY

## 2025-07-01 RX ORDER — PRAVASTATIN SODIUM 40 MG/1
TABLET ORAL
COMMUNITY
Start: 2025-02-28

## 2025-07-01 RX ORDER — ASPIRIN 81 MG/1
81 TABLET ORAL
COMMUNITY

## 2025-07-01 RX ORDER — TIRZEPATIDE 7.5 MG/.5ML
INJECTION, SOLUTION SUBCUTANEOUS
COMMUNITY
Start: 2025-06-20

## 2025-07-01 RX ORDER — PHENTERMINE HYDROCHLORIDE 15 MG/1
7.5 CAPSULE ORAL
COMMUNITY

## 2025-07-01 RX ORDER — CARVEDILOL 3.12 MG/1
3.12 TABLET ORAL
COMMUNITY

## 2025-07-01 RX ORDER — TOPIRAMATE 50 MG/1
50 TABLET, FILM COATED ORAL
COMMUNITY
Start: 2025-05-27

## 2025-07-01 NOTE — PROGRESS NOTES
Subjective:       Patient ID: Yi Ludwig is a 70 y.o. female.    Chief Complaint: No chief complaint on file.      HPI:  70-year-old female patient presenting to clinic to establish care.  Patient has a history of kidney stone.  Last CT was completed in December of 2024.  CT notes bilateral nonobstructing stones bilaterally with the largest measuring 10 mm on the right.  Patient had ureteroscopy in February of 2024 with [facility].  She denies any symptoms at this time    Past Medical History:   Past Medical History:   Diagnosis Date    Cancer     Thyroid    Kidney stone        Past Surgical Historical:   Past Surgical History:   Procedure Laterality Date    CYSTOSCOPY W/ URETERAL STENT PLACEMENT Right 02/23/2023    Procedure: CYSTOSCOPY, WITH URETERAL STENT INSERTION;  Surgeon: Bean Madrigal MD;  Location: [facility];  Service: Urology;  Laterality: Right;  CYSTO WITH RIGHT STENT    HIP REPLACEMENT ARTHROPLASTY Right 09/2024    HYSTERECTOMY      THYROIDECTOMY      WRIST SURGERY Left     02/2022        Medications:   Medication List with Changes/Refills   Current Medications    ASPIRIN (ECOTRIN) 81 MG EC TABLET    Take 81 mg by mouth.    CARVEDILOL (COREG) 3.125 MG TABLET    Take 3.125 mg by mouth.    ESCITALOPRAM OXALATE (LEXAPRO) 20 MG TABLET    Take 20 mg by mouth once daily.    HYDROCODONE-ACETAMINOPHEN (NORCO) 7.5-325 MG PER TABLET    Take 1 tablet by mouth every 6 (six) hours as needed for Pain.    LEVOTHYROXINE (SYNTHROID) 112 MCG TABLET    Take 112 mcg by mouth before breakfast.    MOUNJARO 7.5 MG/0.5 ML PNIJ        OXYBUTYNIN (DITROPAN-XL) 10 MG 24 HR TABLET    Take 1 tablet (10 mg total) by mouth once daily.    PHENTERMINE 15 MG CAPSULE    Take 7.5 mg by mouth as needed.    PRAVASTATIN (PRAVACHOL) 40 MG TABLET        TOPIRAMATE (TOPAMAX) 50 MG TABLET    Take 50 mg by mouth.   Discontinued Medications    ASPIRIN 325 MG TABLET    Take 325 mg by mouth once daily.        Past Social History:  Social History[1]    Allergies: Review of patient's allergies indicates:  No Known Allergies     Family History:   Family History   Problem Relation Name Age of Onset    Cancer Mother          Gallbladder Cancer        Review of Systems:  Review of Systems   Constitutional:  Negative for activity change and appetite change.   HENT:  Negative for congestion and dental problem.    Respiratory:  Negative for chest tightness and shortness of breath.    Cardiovascular:  Negative for chest pain.   Gastrointestinal:  Negative for abdominal distention and abdominal pain.   Genitourinary:  Negative for decreased urine volume, difficulty urinating, dyspareunia, dysuria, enuresis, flank pain, frequency, genital sores, hematuria, pelvic pain and urgency.   Musculoskeletal:  Negative for back pain and neck pain.   Allergic/Immunologic: Negative for immunocompromised state.   Neurological:  Negative for dizziness.   Hematological:  Negative for adenopathy.   Psychiatric/Behavioral:  Negative for agitation, behavioral problems and confusion.        Physical Exam:  Physical Exam  Constitutional:       Appearance: She is well-developed.   HENT:      Head: Normocephalic and atraumatic.      Right Ear: External ear normal.      Left Ear: External ear normal.   Eyes:      Conjunctiva/sclera: Conjunctivae normal.   Neck:      Vascular: No JVD.   Cardiovascular:      Rate and Rhythm: Normal rate and regular rhythm.   Pulmonary:      Effort: Pulmonary effort is normal. No respiratory distress.      Breath sounds: Normal breath sounds. No wheezing.   Abdominal:      General: There is no distension.      Palpations: Abdomen is soft.      Tenderness: There is no abdominal tenderness. There is no rebound.   Musculoskeletal:         General: Normal range of motion.      Cervical back: Normal range of motion.   Skin:     General: Skin is warm and dry.      Findings: No erythema or rash.   Neurological:      Mental Status: She is alert and  oriented to person, place, and time.   Psychiatric:         Behavior: Behavior normal.         Assessment/Plan:     Kidney stone:  Patient has not had updated imaging.  KUB inconclusive.  We will set the patient up for CT scan to evaluate stone burden.  Personally reviewed CT report from December noting bilateral nonobstructing stones with the largest measuring 10 mm.  Follow up based on updated CT report  Problem List Items Addressed This Visit    None  Visit Diagnoses         Calculus of urinary tract in diseases classified elsewhere    -  Primary    Relevant Orders    CT Renal Stone Study ABD Pelvis WO      Calculus of kidney        Relevant Orders    X-Ray Abdomen AP 1 View (Completed)                        [1]   Social History  Socioeconomic History    Marital status:    Tobacco Use    Smoking status: Never    Smokeless tobacco: Never   Substance and Sexual Activity    Alcohol use: Never

## 2025-07-02 LAB
BILIRUBIN, UA POC OHS: NEGATIVE
BLOOD, UA POC OHS: NEGATIVE
CLARITY, UA POC OHS: CLEAR
COLOR, UA POC OHS: YELLOW
GLUCOSE, UA POC OHS: NEGATIVE
KETONES, UA POC OHS: NEGATIVE
LEUKOCYTES, UA POC OHS: ABNORMAL
NITRITE, UA POC OHS: NEGATIVE
PH, UA POC OHS: 5.5
PROTEIN, UA POC OHS: NEGATIVE
SPECIFIC GRAVITY, UA POC OHS: 1.02
UROBILINOGEN, UA POC OHS: 1

## 2025-09-03 ENCOUNTER — TELEPHONE (OUTPATIENT)
Dept: UROLOGY | Facility: CLINIC | Age: 70
End: 2025-09-03
Payer: COMMERCIAL

## 2025-09-05 ENCOUNTER — TELEPHONE (OUTPATIENT)
Dept: UROLOGY | Facility: CLINIC | Age: 70
End: 2025-09-05
Payer: COMMERCIAL

## (undated) DEVICE — TRAY SKIN SCRUB WET PREMIUM

## (undated) DEVICE — BOWL STERILE LARGE 32OZ

## (undated) DEVICE — GUIDEWIRE GLIDEWIRE .035X3X150

## (undated) DEVICE — SUPPORT ULNA NERVE PROTECTOR

## (undated) DEVICE — MARKER WRITESITE SKIN CHLRAPRP

## (undated) DEVICE — POSITIONER HEAD ADULT

## (undated) DEVICE — CONTRAST CYSTOGRAFIN 300ML

## (undated) DEVICE — BAG DRAIN UROLOGY AND HOSE

## (undated) DEVICE — GUIDEWIRE STR TIP HIWIRE 150CM

## (undated) DEVICE — GLOVE PROTEXIS HYDROGEL SZ7.5

## (undated) DEVICE — ADAPTER STOPCOCK FEMALE LL

## (undated) DEVICE — SYR 30CC LUER LOCK

## (undated) DEVICE — KIT SURGICAL TURNOVER

## (undated) DEVICE — SOL IRRI STRL WATER 1000ML

## (undated) DEVICE — CATH POLLACK OPEN-END FLEXI-TI

## (undated) DEVICE — Device

## (undated) DEVICE — SOL IRRIGATION WATER 3000ML